# Patient Record
Sex: FEMALE | Race: BLACK OR AFRICAN AMERICAN | NOT HISPANIC OR LATINO | URBAN - METROPOLITAN AREA
[De-identification: names, ages, dates, MRNs, and addresses within clinical notes are randomized per-mention and may not be internally consistent; named-entity substitution may affect disease eponyms.]

---

## 2021-07-28 ENCOUNTER — APPOINTMENT (RX ONLY)
Dept: URBAN - METROPOLITAN AREA CLINIC 50 | Facility: CLINIC | Age: 71
Setting detail: DERMATOLOGY
End: 2021-07-28

## 2021-07-28 ENCOUNTER — APPOINTMENT (RX ONLY)
Dept: URBAN - METROPOLITAN AREA CLINIC 49 | Facility: CLINIC | Age: 71
Setting detail: DERMATOLOGY
End: 2021-07-28

## 2021-07-28 DIAGNOSIS — L24 IRRITANT CONTACT DERMATITIS: ICD-10-CM

## 2021-07-28 PROBLEM — L24.9 IRRITANT CONTACT DERMATITIS, UNSPECIFIED CAUSE: Status: ACTIVE | Noted: 2021-07-28

## 2021-07-28 PROCEDURE — ? PRESCRIPTION

## 2021-07-28 PROCEDURE — 99203 OFFICE O/P NEW LOW 30 MIN: CPT

## 2021-07-28 PROCEDURE — ? COUNSELING

## 2021-07-28 PROCEDURE — ? TREATMENT REGIMEN

## 2021-07-28 RX ORDER — TRIAMCINOLONE ACETONIDE 1 MG/G
1 OINTMENT TOPICAL BID
Qty: 80 | Refills: 1 | Status: ERX | COMMUNITY
Start: 2021-07-28

## 2021-07-28 RX ADMIN — TRIAMCINOLONE ACETONIDE 1: 1 OINTMENT TOPICAL at 00:00

## 2021-07-28 ASSESSMENT — LOCATION SIMPLE DESCRIPTION DERM
LOCATION SIMPLE: POSTERIOR NECK
LOCATION SIMPLE: POSTERIOR NECK
LOCATION SIMPLE: UPPER BACK
LOCATION SIMPLE: UPPER BACK

## 2021-07-28 ASSESSMENT — LOCATION DETAILED DESCRIPTION DERM
LOCATION DETAILED: SUPERIOR THORACIC SPINE
LOCATION DETAILED: RIGHT INFERIOR POSTERIOR NECK
LOCATION DETAILED: SUPERIOR THORACIC SPINE
LOCATION DETAILED: RIGHT INFERIOR POSTERIOR NECK

## 2021-07-28 ASSESSMENT — LOCATION ZONE DERM
LOCATION ZONE: TRUNK
LOCATION ZONE: TRUNK
LOCATION ZONE: NECK
LOCATION ZONE: NECK

## 2021-07-28 NOTE — PROCEDURE: TREATMENT REGIMEN
Plan: Moisturize daily
Otc Regimen: Dove unscented soap
Samples Given: Cerave psoriasis moisturizing cream apply to affected areas once a day.  La Roche Posay Lipikar balm once a day
Detail Level: Zone
Initiate Treatment: Triamcinolone acetonide 0.1 % topical ointment Bid\\nSig: Apply to itchy areas twice a day x 2 weeks
Discontinue Regimen: Tea tree body wash

## 2021-07-28 NOTE — PROCEDURE: TREATMENT REGIMEN
Samples Given: Cerave psoriasis moisturizing cream apply to affected areas once a day.  La Roche Posay Lipikar balm once a day
Discontinue Regimen: Tea tree body wash
Initiate Treatment: Triamcinolone acetonide 0.1 % topical ointment Bid\\nSig: Apply to itchy areas twice a day x 2 weeks
Plan: Moisturize daily
Detail Level: Zone
Otc Regimen: Dove unscented soap

## 2021-07-28 NOTE — HPI: RASH
What Type Of Note Output Would You Prefer (Optional)?: Standard Output
How Severe Is Your Rash?: moderate
Is This A New Presentation, Or A Follow-Up?: Rash
Additional History: Patient states that she had been using a tea tree body wash and doesn’t know if this has caused the problem.  She has been treating with otc anti itch cream and cortisone.  She states this helps a little but not much.  Patient also states she was seeing another dermatologist last year for itching on the center of her back, she was given two medications but she is unsure of the names. She used these medications for 2-3 months.

## 2023-04-09 NOTE — PROCEDURE: COUNSELING
Chest pain, elevated troponin, no significant CAD on prior LHC.    Trend troponin and consult cardiology.           TY Risk Score:  4    (age>65, ASA, 3 risk factors, known CAD, angina, ECG, troponin)  ECG:  Non-specific ST changes.     Diagnostics: trend troponin, telemetry, echo if not recent, consider repeat ECG    Plan:   Oxygen  high intensity statin: home dose given.   beta blocker: metoprolol   ASA is not prescribed as this appears to be non-obstructive.  F/u with cardiology.   Additional antiplatelet agent:   If EF40% or less, spironolactone and ACE/ARB.   Hold for elevated potassium or poor renal function or hypotension.    Consulted cardiology and appreciate recommendations.       Cardiac rehab ( consult)  If applicable, educate on medication adherence, blood pressure control, stress reduction, cholesterol, tobacco use, weight management, diet, diabetes, physical activity    Troponin:   Recent Labs   Lab 04/08/23  1602   TROPONINIHS 65.1*     BNP: No results for input(s): BNP in the last 168 hours.    Invalid input(s): BNPTRIAGEBLE  Lipid panel:   Lab Results   Component Value Date    CHOL 254 (H) 02/16/2023    CHOL 231 (H) 11/15/2022     Lab Results   Component Value Date    HDL 49 02/16/2023    HDL 46 11/15/2022     No results found for: LDLCALC  A1c:   Lab Results   Component Value Date    HGBA1C 12.4 (H) 02/16/2023     
Detail Level: Simple
Detail Level: Detailed